# Patient Record
(demographics unavailable — no encounter records)

---

## 2017-01-14 NOTE — MR
MRI Lumbar Spine (Without and With Contrast)

 

History: Lipoma, status post removal. Follow up. Marker denotes location.

 

Comparison: September 15, 2016.

 

Technique: Sagittal and axial T1 and T2 second echo imaging with fat suppression. Postcontrast (7 mL 
intravenous Gadavist) sagittal and axial T1-weighted images.

 

Findings:  Postoperative scar at the right posterior sacral region is again noted. There is no abnorm
al enhancement associated with this. The previously present fluid/cavity now is collapsed, with resol
ution of the fluid. No residual enhancing tumor or new lipoma.

 

Otherwise, the lumbar spine is fairly normal for patient's age. Conus terminates at L1. No compressio
n deformity. Small central disk bulge at L5-S1 is slightly increased in size, causing mild ventral th
ecal sac indentation. The disk at this time measures 7.1 mm right to left x 6.5 mm anterior to poster
ior compared to previous 5.7 mm anterior to posterior. No degenerative disk dehydration or disk heigh
t loss.

 

Impression:

1. Interval resolution of fluid collection in the postoperative cavity at the posterior aspect of the
 right SI joint.

2. No residual or recurrent lipoma.

3. Central disk bulge at L5-S1 slightly increased in depth compared to prior MRI. It is not causing s
ignificant canal or foraminal stenosis.